# Patient Record
Sex: MALE
[De-identification: names, ages, dates, MRNs, and addresses within clinical notes are randomized per-mention and may not be internally consistent; named-entity substitution may affect disease eponyms.]

---

## 2023-06-29 ENCOUNTER — DASHBOARD ENCOUNTERS (OUTPATIENT)
Age: 78
End: 2023-06-29

## 2023-06-29 ENCOUNTER — OFFICE VISIT (OUTPATIENT)
Dept: URBAN - METROPOLITAN AREA CLINIC 113 | Facility: CLINIC | Age: 78
End: 2023-06-29
Payer: MEDICARE

## 2023-06-29 VITALS — RESPIRATION RATE: 18 BRPM | WEIGHT: 149.2 LBS

## 2023-06-29 DIAGNOSIS — C64.1 RENAL CELL CARCINOMA, RIGHT: ICD-10-CM

## 2023-06-29 DIAGNOSIS — K80.50 CHOLEDOCHOLITHIASIS: ICD-10-CM

## 2023-06-29 PROBLEM — 702391001: Status: ACTIVE | Noted: 2023-06-29

## 2023-06-29 PROCEDURE — 99213 OFFICE O/P EST LOW 20 MIN: CPT | Performed by: NURSE PRACTITIONER

## 2023-06-29 NOTE — HPI-TODAY'S VISIT:
78-year-old male who presented to [facility] for further evaluation of dark-colored urine and jaundice, presenting for hospital follow-up. He was seen in consultation by Dr. Wesley Nguyen.  CT abdomen and pelvis with contrast 5/8/2023 revealed an obstructing 5 mm stone within the distal common bile duct, sequela of chronic cholecystitis with possible superimposed acute component, moderate right inguinal hernia containing nonobstructed loop of sigmoid colon, and a 1.8 cm lesion in the right kidney concerning for renal cell carcinoma.  No metastasis noted on imaging.  He underwent ERCP 5/10/2023 which was notable for choledocholithiasis status post biliary sphincterotomy and balloon extraction.  Purulent biliary drainage consistent with early cholangitis.  There is a small hiatal hernia.  He was recommended cholecystectomy, he ultimately decided against surgery with plans to reconsider if symptoms recurred. Diagnosis of renal cell carcinoma was confirmed on biopsy.  He is following up with Dr. Koch. He meets with Dr. Koch on 7/11/23 to discuss treatment options.   He is without any abdominal complaints. No dysphagia, heartburn, regurgitation, unintentional weight loss, nausea, vomiting, hematemesis or melena. Bowels are moving regularly without blood per rectum. No complaints of bloating. No jaundice, icterus.

## 2023-06-30 LAB
A/G RATIO: 1.8
ABSOLUTE BASOPHILS: 54
ABSOLUTE EOSINOPHILS: 268
ABSOLUTE LYMPHOCYTES: 2076
ABSOLUTE MONOCYTES: 1059
ABSOLUTE NEUTROPHILS: 7244
ALBUMIN: 4.5
ALKALINE PHOSPHATASE: 78
ALT (SGPT): 18
AST (SGOT): 14
BASOPHILS: 0.5
BILIRUBIN, TOTAL: 0.6
BUN/CREATININE RATIO: 36
BUN: 27
CALCIUM: 9.8
CARBON DIOXIDE, TOTAL: 24
CHLORIDE: 104
CREATININE: 0.75
EGFR: 94
EOSINOPHILS: 2.5
GLOBULIN, TOTAL: 2.5
GLUCOSE: 106
HEMATOCRIT: 48.4
HEMOGLOBIN: 16.5
LYMPHOCYTES: 19.4
MCH: 31.1
MCHC: 34.1
MCV: 91.3
MONOCYTES: 9.9
MPV: 10.5
NEUTROPHILS: 67.7
PLATELET COUNT: 266
POTASSIUM: 4.2
PROTEIN, TOTAL: 7
RDW: 13.1
RED BLOOD CELL COUNT: 5.3
SODIUM: 141
WHITE BLOOD CELL COUNT: 10.7

## 2023-08-07 ENCOUNTER — TELEPHONE ENCOUNTER (OUTPATIENT)
Dept: URBAN - METROPOLITAN AREA CLINIC 113 | Facility: CLINIC | Age: 78
End: 2023-08-07

## 2023-08-09 ENCOUNTER — LAB OUTSIDE AN ENCOUNTER (OUTPATIENT)
Dept: URBAN - METROPOLITAN AREA CLINIC 113 | Facility: CLINIC | Age: 78
End: 2023-08-09

## 2023-08-10 ENCOUNTER — LAB OUTSIDE AN ENCOUNTER (OUTPATIENT)
Dept: URBAN - METROPOLITAN AREA CLINIC 113 | Facility: CLINIC | Age: 78
End: 2023-08-10

## 2023-08-10 ENCOUNTER — TELEPHONE ENCOUNTER (OUTPATIENT)
Dept: URBAN - METROPOLITAN AREA CLINIC 113 | Facility: CLINIC | Age: 78
End: 2023-08-10

## 2023-08-11 ENCOUNTER — CLAIMS CREATED FROM THE CLAIM WINDOW (OUTPATIENT)
Dept: URBAN - METROPOLITAN AREA MEDICAL CENTER 2 | Facility: MEDICAL CENTER | Age: 78
End: 2023-08-11
Payer: MEDICARE

## 2023-08-11 DIAGNOSIS — K83.8 ACQUIRED DILATION OF BILE DUCT: ICD-10-CM

## 2023-08-11 DIAGNOSIS — K83.1 AMPULLA OF VATER OBSTRUCTION SYNDROME: ICD-10-CM

## 2023-08-11 DIAGNOSIS — K83.1 OBSTRUCTION OF BILE DUCT: ICD-10-CM

## 2023-08-11 PROCEDURE — 74328 X-RAY BILE DUCT ENDOSCOPY: CPT | Performed by: INTERNAL MEDICINE

## 2023-08-11 PROCEDURE — 99222 1ST HOSP IP/OBS MODERATE 55: CPT | Performed by: INTERNAL MEDICINE

## 2023-08-11 PROCEDURE — 43262 ENDO CHOLANGIOPANCREATOGRAPH: CPT | Performed by: INTERNAL MEDICINE

## 2023-08-12 ENCOUNTER — CLAIMS CREATED FROM THE CLAIM WINDOW (OUTPATIENT)
Dept: URBAN - METROPOLITAN AREA MEDICAL CENTER 2 | Facility: MEDICAL CENTER | Age: 78
End: 2023-08-12
Payer: MEDICARE

## 2023-08-12 DIAGNOSIS — K83.1 OBSTRUCTION OF BILE DUCT: ICD-10-CM

## 2023-08-12 LAB
A/G RATIO: 1.7
ABSOLUTE BASOPHILS: 28
ABSOLUTE EOSINOPHILS: 166
ABSOLUTE LYMPHOCYTES: 1288
ABSOLUTE MONOCYTES: 800
ABSOLUTE NEUTROPHILS: 6918
ALBUMIN: 3.9
ALKALINE PHOSPHATASE: 442
ALT (SGPT): 201
AST (SGOT): 89
BASOPHILS: 0.3
BILIRUBIN, TOTAL: 9.1
BUN/CREATININE RATIO: (no result)
BUN: 17
CALCIUM: 9.4
CARBON DIOXIDE, TOTAL: 24
CHLORIDE: 104
CREATININE: 0.78
EGFR: 92
EOSINOPHILS: 1.8
GLOBULIN, TOTAL: 2.3
GLUCOSE: 129
HEMATOCRIT: 45.7
HEMOGLOBIN: 15.7
INR: 1
INR: 1
LYMPHOCYTES: 14
MCH: 30.7
MCHC: 34.4
MCV: 89.4
MONOCYTES: 8.7
MPV: 11.6
NEUTROPHILS: 75.2
PLATELET COUNT: 301
POTASSIUM: 4.3
PROTEIN, TOTAL: 6.2
PT: 10.5
PT: 10.5
RDW: 12.6
RED BLOOD CELL COUNT: 5.11
SODIUM: 138
WHITE BLOOD CELL COUNT: 9.2

## 2023-08-12 PROCEDURE — 99238 HOSP IP/OBS DSCHRG MGMT 30/<: CPT | Performed by: INTERNAL MEDICINE

## 2023-08-12 PROCEDURE — 99231 SBSQ HOSP IP/OBS SF/LOW 25: CPT | Performed by: INTERNAL MEDICINE

## 2023-08-14 ENCOUNTER — LAB OUTSIDE AN ENCOUNTER (OUTPATIENT)
Dept: URBAN - METROPOLITAN AREA CLINIC 107 | Facility: CLINIC | Age: 78
End: 2023-08-14

## 2023-08-14 ENCOUNTER — TELEPHONE ENCOUNTER (OUTPATIENT)
Dept: URBAN - METROPOLITAN AREA CLINIC 107 | Facility: CLINIC | Age: 78
End: 2023-08-14

## 2023-08-14 ENCOUNTER — OFFICE VISIT (OUTPATIENT)
Dept: URBAN - METROPOLITAN AREA MEDICAL CENTER 19 | Facility: MEDICAL CENTER | Age: 78
End: 2023-08-14
Payer: MEDICARE

## 2023-08-14 ENCOUNTER — TELEPHONE ENCOUNTER (OUTPATIENT)
Dept: URBAN - METROPOLITAN AREA CLINIC 113 | Facility: CLINIC | Age: 78
End: 2023-08-14

## 2023-08-14 DIAGNOSIS — K83.1 BILIARY STRICTURE: ICD-10-CM

## 2023-08-14 DIAGNOSIS — K83.8 DILATED BILE DUCT: ICD-10-CM

## 2023-08-14 DIAGNOSIS — R17 JAUNDICE: ICD-10-CM

## 2023-08-14 DIAGNOSIS — R93.2 ABN FIND-BILIARY TRACT: ICD-10-CM

## 2023-08-14 PROBLEM — 235921003: Status: ACTIVE | Noted: 2023-08-14

## 2023-08-14 PROCEDURE — 74328 X-RAY BILE DUCT ENDOSCOPY: CPT | Performed by: INTERNAL MEDICINE

## 2023-08-14 PROCEDURE — 43274 ERCP DUCT STENT PLACEMENT: CPT | Performed by: INTERNAL MEDICINE

## 2023-08-18 ENCOUNTER — TELEPHONE ENCOUNTER (OUTPATIENT)
Dept: URBAN - METROPOLITAN AREA CLINIC 113 | Facility: CLINIC | Age: 78
End: 2023-08-18

## 2023-08-24 ENCOUNTER — CLAIMS CREATED FROM THE CLAIM WINDOW (OUTPATIENT)
Dept: URBAN - METROPOLITAN AREA MEDICAL CENTER 19 | Facility: MEDICAL CENTER | Age: 78
End: 2023-08-24
Payer: MEDICARE

## 2023-08-24 DIAGNOSIS — K83.1 OBSTRUCTION OF BILE DUCT: ICD-10-CM

## 2023-08-24 DIAGNOSIS — K81.9 CHOLECYSTITIS, UNSPECIFIED: ICD-10-CM

## 2023-08-24 DIAGNOSIS — R74.8 ABNORMAL ALKALINE PHOSPHATASE TEST: ICD-10-CM

## 2023-08-24 DIAGNOSIS — R93.2 ABN FIND-BILIARY TRACT: ICD-10-CM

## 2023-08-24 DIAGNOSIS — R10.11 ABDOMINAL BURNING SENSATION IN RIGHT UPPER QUADRANT: ICD-10-CM

## 2023-08-24 PROCEDURE — 99232 SBSQ HOSP IP/OBS MODERATE 35: CPT | Performed by: PHYSICIAN ASSISTANT

## 2023-08-24 PROCEDURE — 99232 SBSQ HOSP IP/OBS MODERATE 35: CPT | Performed by: INTERNAL MEDICINE

## 2023-08-24 PROCEDURE — 99222 1ST HOSP IP/OBS MODERATE 55: CPT | Performed by: PHYSICIAN ASSISTANT

## 2023-08-24 PROCEDURE — G8427 DOCREV CUR MEDS BY ELIG CLIN: HCPCS | Performed by: PHYSICIAN ASSISTANT

## 2023-09-22 ENCOUNTER — TELEPHONE ENCOUNTER (OUTPATIENT)
Dept: URBAN - METROPOLITAN AREA CLINIC 113 | Facility: CLINIC | Age: 78
End: 2023-09-22

## 2023-09-26 ENCOUNTER — LAB OUTSIDE AN ENCOUNTER (OUTPATIENT)
Dept: URBAN - METROPOLITAN AREA CLINIC 113 | Facility: CLINIC | Age: 78
End: 2023-09-26

## 2023-10-23 ENCOUNTER — TELEPHONE ENCOUNTER (OUTPATIENT)
Dept: URBAN - METROPOLITAN AREA CLINIC 113 | Facility: CLINIC | Age: 78
End: 2023-10-23

## 2023-10-23 ENCOUNTER — OFFICE VISIT (OUTPATIENT)
Dept: URBAN - METROPOLITAN AREA MEDICAL CENTER 19 | Facility: MEDICAL CENTER | Age: 78
End: 2023-10-23
Payer: MEDICARE

## 2023-10-23 DIAGNOSIS — Z46.59 ENCOUNTER FOR CARE RELATED TO FEEDING TUBE: ICD-10-CM

## 2023-10-23 DIAGNOSIS — K83.8 ACQUIRED DILATION OF BILE DUCT: ICD-10-CM

## 2023-10-23 DIAGNOSIS — T18.3XXA FOREIGN BODY IN DUODENUM: ICD-10-CM

## 2023-10-23 PROCEDURE — 43275 ERCP REMOVE FORGN BODY DUCT: CPT | Performed by: INTERNAL MEDICINE

## 2023-10-23 PROCEDURE — 74328 X-RAY BILE DUCT ENDOSCOPY: CPT | Performed by: INTERNAL MEDICINE

## 2023-10-23 RX ORDER — CIPROFLOXACIN HYDROCHLORIDE 500 MG/1
1 TABLET TABLET, FILM COATED ORAL
Qty: 6 | Refills: 0 | OUTPATIENT
Start: 2023-10-23 | End: 2023-10-26

## 2023-11-04 LAB
ALBUMIN/GLOBULIN RATIO: 1.9
ALBUMIN: 4.5
ALKALINE PHOSPHATASE: 68
ALT (SGPT): 18
AST (SGOT): 18
BILIRUBIN, DIRECT: 0.1
BILIRUBIN, INDIRECT: 0.5
BILIRUBIN, TOTAL: 0.6
GLOBULIN: 2.4
PROTEIN, TOTAL: 6.9

## 2023-11-06 ENCOUNTER — TELEPHONE ENCOUNTER (OUTPATIENT)
Dept: URBAN - METROPOLITAN AREA CLINIC 113 | Facility: CLINIC | Age: 78
End: 2023-11-06

## 2023-12-19 ENCOUNTER — TELEPHONE ENCOUNTER (OUTPATIENT)
Dept: URBAN - METROPOLITAN AREA CLINIC 107 | Facility: CLINIC | Age: 78
End: 2023-12-19

## 2023-12-19 ENCOUNTER — LAB OUTSIDE AN ENCOUNTER (OUTPATIENT)
Dept: URBAN - METROPOLITAN AREA CLINIC 107 | Facility: CLINIC | Age: 78
End: 2023-12-19

## 2023-12-24 LAB
ALBUMIN/GLOBULIN RATIO: 1.9
ALBUMIN: 4.3
ALKALINE PHOSPHATASE: 62
ALT (SGPT): 14
AST (SGOT): 17
BILIRUBIN, DIRECT: 0.1
BILIRUBIN, INDIRECT: 0.5
BILIRUBIN, TOTAL: 0.6
GLOBULIN: 2.3
PROTEIN, TOTAL: 6.6

## 2024-01-02 ENCOUNTER — TELEPHONE ENCOUNTER (OUTPATIENT)
Dept: URBAN - METROPOLITAN AREA CLINIC 113 | Facility: CLINIC | Age: 79
End: 2024-01-02